# Patient Record
Sex: FEMALE | Race: BLACK OR AFRICAN AMERICAN | HISPANIC OR LATINO | Employment: UNEMPLOYED | URBAN - METROPOLITAN AREA
[De-identification: names, ages, dates, MRNs, and addresses within clinical notes are randomized per-mention and may not be internally consistent; named-entity substitution may affect disease eponyms.]

---

## 2023-06-25 PROBLEM — E11.65 TYPE 2 DIABETES MELLITUS WITH HYPERGLYCEMIA: Status: RESOLVED | Noted: 2023-06-25 | Resolved: 2023-06-25

## 2023-06-25 PROBLEM — E11.65 TYPE 2 DIABETES MELLITUS WITH HYPERGLYCEMIA: Status: ACTIVE | Noted: 2023-06-25

## 2023-06-25 PROBLEM — E66.01 MORBID (SEVERE) OBESITY DUE TO EXCESS CALORIES: Status: ACTIVE | Noted: 2023-06-25

## 2023-06-25 PROBLEM — Z83.3 FAMILY HISTORY OF DIABETES MELLITUS TYPE I: Status: ACTIVE | Noted: 2023-06-25

## 2023-06-25 PROBLEM — E11.9 DIABETES MELLITUS, NEW ONSET: Status: ACTIVE | Noted: 2023-06-25

## 2023-07-25 ENCOUNTER — OFFICE VISIT (OUTPATIENT)
Dept: FAMILY MEDICINE CLINIC | Facility: CLINIC | Age: 23
End: 2023-07-25
Payer: COMMERCIAL

## 2023-07-25 VITALS
RESPIRATION RATE: 16 BRPM | SYSTOLIC BLOOD PRESSURE: 122 MMHG | DIASTOLIC BLOOD PRESSURE: 78 MMHG | HEART RATE: 97 BPM | WEIGHT: 198 LBS | OXYGEN SATURATION: 99 % | HEIGHT: 63 IN | BODY MASS INDEX: 35.08 KG/M2

## 2023-07-25 DIAGNOSIS — E78.1 HYPERTRIGLYCERIDEMIA: ICD-10-CM

## 2023-07-25 DIAGNOSIS — E11.9 DIABETES MELLITUS, NEW ONSET: Primary | ICD-10-CM

## 2023-07-25 DIAGNOSIS — R74.01 ELEVATED ALANINE AMINOTRANSFERASE (ALT) LEVEL: ICD-10-CM

## 2023-07-25 NOTE — PROGRESS NOTES
"Chief Complaint  Diabetes (Follow up )    Subjective          Pili Carter presents to [facility] for  History of Present Illness  She is here to follow-up on Diabetes:    Diabetes - her fasting blood glucose level ranges 120-176.  She has been watching her sugar and carbohydrate intake.  She admits having small portions of rice and bread.  She reports eating pasta.  She has not been eating potato.  She reports eating fruit, vegetables and lean meat.  She reports taking Lantus 10 units nightly.  She takes Metformin 500mg BID and tolerating well.  She denies episode of hypoglycemia.    Hypertriglyceridemia - elevated on prior lab from 6/14/23.    Liver Enzyme (ALT) elevated on prior lab from 6/14/23.        Review of Systems   Respiratory:  Negative for shortness of breath.    Cardiovascular:  Negative for chest pain and palpitations.   Endocrine: Negative for polydipsia, polyphagia and polyuria.       Objective   Vital Signs:   /78 (BP Location: Right arm, Patient Position: Sitting, Cuff Size: Adult)   Pulse 97   Resp 16   Ht 160 cm (62.99\")   Wt 89.8 kg (198 lb)   SpO2 99%   BMI 35.08 kg/mý     Physical Exam  Vitals and nursing note reviewed.   Constitutional:       Appearance: Normal appearance.   HENT:      Head: Normocephalic and atraumatic.   Eyes:      Conjunctiva/sclera: Conjunctivae normal.   Cardiovascular:      Rate and Rhythm: Normal rate and regular rhythm.      Heart sounds: Normal heart sounds. No murmur heard.  Pulmonary:      Effort: Pulmonary effort is normal. No respiratory distress.      Breath sounds: Normal breath sounds.   Skin:     General: Skin is warm and dry.   Neurological:      Mental Status: She is alert.   Psychiatric:         Mood and Affect: Mood normal.      Result Review :   The following data was reviewed by: ALYSE Walter on 07/25/2023:  CMP          6/14/2023    16:06   CMP   Glucose CANCELED    BUN 13    Creatinine " 0.96    Sodium 137    Potassium CANCELED    Chloride 101    Calcium 9.2    Total Protein 6.7    Albumin 4.3    Globulin 2.4    Total Bilirubin 0.4    Alkaline Phosphatase 120    AST (SGOT) 33    ALT (SGPT) 65    BUN/Creatinine Ratio 14      CBC w/diff          6/14/2023    16:06   CBC w/Diff   WBC 7.4    RBC 4.58    Hemoglobin 13.3    Hematocrit 40.0    MCV 87    MCH 29.0    MCHC 33.3    RDW 12.3    Platelets 236    Neutrophil Rel % 55    Lymphocyte Rel % 32    Monocyte Rel % 7    Eosinophil Rel % 6    Basophil Rel % 0      Lipid Panel          6/14/2023    16:06   Lipid Panel   Total Cholesterol 118    Triglycerides 187    HDL Cholesterol 31    VLDL Cholesterol 31    LDL Cholesterol  56      Most Recent A1C          6/14/2023    13:45   HGBA1C Most Recent   Hemoglobin A1C 10.2                Assessment and Plan    Diagnoses and all orders for this visit:    1. Diabetes mellitus, new onset (Primary)  Assessment & Plan:  Diabetes is newly identified.  A1c 10.2 on labs from 6/14/23.  Reminded to bring in blood sugar diary at next visit.  Dietary recommendations for ADA diet.  Regular aerobic exercise.  Discussed ways to avoid symptomatic hypoglycemia.  Discussed foot care.  Reminded to get yearly retinal exam.  Medication changes per orders.  Check blood glucose level before breakfast every morning and log glucose levels on log and bring log to next visit.  Increase Metformin 500mg - 2 tablets at AM and 1 tablet in PM  Lantus 10 units nightly.  Lantus - if blood glucose drops below 90 decrease Lantus by 4 units.  Diabetes will be reassessed in 1 month.    Orders:  -     Insulin Pen Needle 30G X 5 MM misc; One daily as directed.  E11.9  Dispense: 100 each; Refill: 1    2. Hypertriglyceridemia  Assessment & Plan:  Lipid abnormalities are newly identified.  Nutritional counseling was provided.  Discussed importance of decreasing food/drink high in sugar/carbs to help reduce glucose levels.  Increase activity.  Lipids  will be reassessed in 3 months.      3. Elevated alanine aminotransferase (ALT) level  Assessment & Plan:  Elevated ALT on prior lab from 6/14/23.  Discussed importance of lifestyle changes.  Will continue to monitor.          Follow Up   Return in about 1 month (around 8/25/2023) for Next scheduled follow up - Diabetes.  Patient was given instructions and counseling regarding her condition or for health maintenance advice. Please see specific information pulled into the AVS if appropriate.

## 2023-08-12 PROBLEM — R74.01 ELEVATED ALANINE AMINOTRANSFERASE (ALT) LEVEL: Status: ACTIVE | Noted: 2023-08-12

## 2023-08-12 PROBLEM — E78.1 HYPERTRIGLYCERIDEMIA: Status: ACTIVE | Noted: 2023-08-12

## 2023-08-13 NOTE — ASSESSMENT & PLAN NOTE
Lipid abnormalities are newly identified.  Nutritional counseling was provided.  Discussed importance of decreasing food/drink high in sugar/carbs to help reduce glucose levels.  Increase activity.  Lipids will be reassessed in 3 months.

## 2023-08-13 NOTE — ASSESSMENT & PLAN NOTE
Elevated ALT on prior lab from 6/14/23.  Discussed importance of lifestyle changes.  Will continue to monitor.

## 2023-08-13 NOTE — ASSESSMENT & PLAN NOTE
Diabetes is newly identified.  A1c 10.2 on labs from 6/14/23.  Reminded to bring in blood sugar diary at next visit.  Dietary recommendations for ADA diet.  Regular aerobic exercise.  Discussed ways to avoid symptomatic hypoglycemia.  Discussed foot care.  Reminded to get yearly retinal exam.  Medication changes per orders.  Check blood glucose level before breakfast every morning and log glucose levels on log and bring log to next visit.  Increase Metformin 500mg - 2 tablets at AM and 1 tablet in PM  Lantus 10 units nightly.  Lantus - if blood glucose drops below 90 decrease Lantus by 4 units.  Diabetes will be reassessed in 1 month.

## 2025-02-24 ENCOUNTER — OFFICE VISIT (OUTPATIENT)
Dept: FAMILY MEDICINE CLINIC | Facility: CLINIC | Age: 25
End: 2025-02-24
Payer: COMMERCIAL

## 2025-02-24 VITALS
WEIGHT: 186.2 LBS | HEIGHT: 63 IN | OXYGEN SATURATION: 99 % | HEART RATE: 90 BPM | BODY MASS INDEX: 32.99 KG/M2 | SYSTOLIC BLOOD PRESSURE: 124 MMHG | DIASTOLIC BLOOD PRESSURE: 78 MMHG

## 2025-02-24 DIAGNOSIS — E78.1 HYPERTRIGLYCERIDEMIA: Chronic | ICD-10-CM

## 2025-02-24 DIAGNOSIS — Z72.0 TOBACCO USE: Chronic | ICD-10-CM

## 2025-02-24 DIAGNOSIS — Z00.00 HEALTH CARE MAINTENANCE: ICD-10-CM

## 2025-02-24 DIAGNOSIS — E11.65 TYPE 2 DIABETES MELLITUS WITH HYPERGLYCEMIA, WITHOUT LONG-TERM CURRENT USE OF INSULIN: Chronic | ICD-10-CM

## 2025-02-24 DIAGNOSIS — R74.01 ELEVATED ALANINE AMINOTRANSFERASE (ALT) LEVEL: Chronic | ICD-10-CM

## 2025-02-24 DIAGNOSIS — Z83.3 FAMILY HISTORY OF DIABETES MELLITUS IN MOTHER: Chronic | ICD-10-CM

## 2025-02-24 DIAGNOSIS — E66.01 MORBID (SEVERE) OBESITY DUE TO EXCESS CALORIES: Chronic | ICD-10-CM

## 2025-02-24 DIAGNOSIS — Z00.00 ENCOUNTER FOR ANNUAL PHYSICAL EXAM: Primary | ICD-10-CM

## 2025-02-24 LAB
EXPIRATION DATE: ABNORMAL
HBA1C MFR BLD: 9.5 % (ref 4.5–5.7)
Lab: ABNORMAL

## 2025-02-24 RX ORDER — LANCETS 33 GAUGE
1 EACH MISCELLANEOUS DAILY
Qty: 100 EACH | Refills: 1 | Status: SHIPPED | OUTPATIENT
Start: 2025-02-24

## 2025-02-24 RX ORDER — METFORMIN HYDROCHLORIDE 500 MG/1
1000 TABLET, EXTENDED RELEASE ORAL 2 TIMES DAILY WITH MEALS
Qty: 360 TABLET | Refills: 1 | Status: SHIPPED | OUTPATIENT
Start: 2025-02-24

## 2025-02-24 RX ORDER — BLOOD SUGAR DIAGNOSTIC
STRIP MISCELLANEOUS
Qty: 100 EACH | Refills: 1 | Status: SHIPPED | OUTPATIENT
Start: 2025-02-24

## 2025-02-24 RX ORDER — BLOOD-GLUCOSE METER
1 KIT MISCELLANEOUS DAILY
Qty: 1 EACH | Refills: 0 | Status: SHIPPED | OUTPATIENT
Start: 2025-02-24

## 2025-02-24 NOTE — PROGRESS NOTES
Chief Complaint  Annual checkup.     HISTORY    Pili Carter is a 25 y.o. female who presents to the office today as a  an established patient for their annual preventative exam.     No hospitalization(s) within the last year.     Current exercise regimen: None.    Status of chronic medical conditions:    Diabetes - uncontrolled on labs from 6/14/23; A1c 10.2.  She denies currently taking any medication for DM.  She had a lapse in insurance and stopped taking her DM medication when she saw me last around 7/2023.  She said [place] and ran out of that medication in 9/2023.  She has not been cutting back on her sugar and carbs.    She admits only eating 2 meals a day and her levels are still high.  She is not sure of her blood glucose numbers because it has been a while since she measured her glucose level.    Hypertriglyceridemia - triglycerides elevated on prior lab from 6/14/23.    Patient's overall comments about their health or any other specific health concerns they report: She reports her health is fair.    First day of last menstrual period if pre-menopausal: She does not recall because she has Nexplanon and her periods are very irregular.    Patient's contraception status (if applicable):   Nexplanon.    :  Shannan ID# [id number]    Review of Systems   Constitutional: Negative.    HENT: Negative.     Eyes: Negative.    Respiratory: Negative.     Cardiovascular: Negative.    Gastrointestinal: Negative.    Endocrine: Negative for cold intolerance, heat intolerance, polydipsia, polyphagia and polyuria.   Genitourinary:  Positive for menstrual problem (has nexplanon, made appt with GYN r/t removal nexplanon). Negative for amenorrhea, breast discharge, breast lump, breast pain, decreased libido, decreased urine volume, difficulty urinating, dyspareunia, dysuria, flank pain, frequency, genital sores, hematuria, pelvic pain, pelvic pressure,  urgency, urinary incontinence, vaginal bleeding, vaginal discharge and vaginal pain.   Musculoskeletal:  Positive for back pain.        Aching in legs when standing for a while   Skin: Negative.    Allergic/Immunologic: Negative.    Neurological: Negative.    Hematological: Negative.    Psychiatric/Behavioral: Negative.          Health Maintenance Summary            Overdue - DIABETIC FOOT EXAM (Yearly) Never done      No completion history exists for this topic.              Overdue - DIABETIC EYE EXAM (Yearly) Never done      No completion history exists for this topic.              Overdue - HPV VACCINES (1 - 3-dose series) Never done      No completion history exists for this topic.              Overdue - Hepatitis B (1 of 3 - 19+ 3-dose series) Never done      No completion history exists for this topic.              Overdue - Pneumococcal Vaccine 0-49 (1 of 2 - PCV) Never done      No completion history exists for this topic.              Overdue - HEPATITIS C SCREENING (Once) Never done      No completion history exists for this topic.              Overdue - ANNUAL PHYSICAL (Yearly) Never done      No completion history exists for this topic.              Overdue - CHLAMYDIA SCREENING (Yearly) Overdue since 1/25/2024 01/25/2023  Chlamydia trachomatis, SMITH component of Chlamydia trachomatis, Neisseria gonorrhoeae, Trichomonas vaginalis, PCR - Swab, Cervix              Overdue - COVID-19 Vaccine (1 - 2024-25 season) Never done      No completion history exists for this topic.              Postponed - INFLUENZA VACCINE (Yearly - July to March) Postponed until 3/31/2025      No completion history exists for this topic.              HEMOGLOBIN A1C (Every 6 Months) Next due on 8/24/2025 02/24/2025  Hemoglobin A1C component of POC Glycosylated Hemoglobin (Hb A1C)    06/14/2023  Hemoglobin A1C component of POC Glycosylated Hemoglobin (Hb A1C)              PAP SMEAR (Every 3 Years) Next due on 1/25/2026       01/25/2023  IGP, Rfx Aptima HPV ASCU              LIPID PANEL (Yearly) Next due on 2/24/2026 02/24/2025  Lipid Panel    06/14/2023  Lipid Panel              URINE MICROALBUMIN-CREATININE RATIO (uACR) (Yearly) Next due on 2/24/2026 02/24/2025  Microalbumin/Creatinine Ratio component of Microalbumin / Creatinine Urine Ratio - Urine, Clean Catch              BMI FOLLOWUP (Yearly) Next due on 2/24/2026 02/24/2025  SmartData: WORKFLOW - QUALITY MEASUREMENT - DOCUMENTED WEIGHT FOLLOW-UP PLAN    02/24/2025  SmartData: WORKFLOW - QUALITY MEASUREMENT - BMI FOLLOW UP CARE PLAN DOCUMENTED    06/14/2023  SmartData: WORKFLOW - QUALITY MEASUREMENT - BMI FOLLOW UP CARE PLAN DOCUMENTED    06/14/2023  SmartData: WORKFLOW - QUALITY MEASUREMENT - DOCUMENTED WEIGHT FOLLOW-UP PLAN              TDAP/TD VACCINES (2 - Td or Tdap) Next due on 4/25/2033 04/25/2023  Outside Immunization: Tdap, Adsorbed                     No Known Allergies     Outpatient Medications Marked as Taking for the 2/24/25 encounter (Office Visit) with Ananya Garland APRN   Medication Sig Dispense Refill    glucose blood (Cool Blood Glucose Test Strips) test strip USE BRAND INSURANCE COVERS; E11.65 100 each 1    glucose monitor monitoring kit Use 1 each Daily. USE BRAND INSURANCE COVERS; E11.65 1 each 0    Lancets 33G misc Use 1 each Daily. USE BRAND INSURANCE COVERS; e11.65 100 each 1    metFORMIN ER (GLUCOPHAGE-XR) 500 MG 24 hr tablet Take 2 tablets by mouth 2 (Two) Times a Day With Meals. E11.65 360 tablet 1        Past Medical History:   Diagnosis Date    Diabetes mellitus, new onset 06/25/2023     Past Surgical History:   Procedure Laterality Date    APPENDECTOMY       Family History   Problem Relation Age of Onset    Uterine cancer Mother     Ovarian cancer Neg Hx     Breast cancer Neg Hx     Colon cancer Neg Hx     Deep vein thrombosis Neg Hx     Pulmonary embolism Neg Hx     reports that she has been smoking cigarettes. She  "started smoking about 2 years ago. She has a 1 pack-year smoking history. She has never been exposed to tobacco smoke. She has never used smokeless tobacco. She reports current alcohol use. She reports that she does not use drugs.      There is no immunization history on file for this patient.     OBJECTIVE    Vital Signs:   /78 (BP Location: Left arm, Patient Position: Sitting, Cuff Size: Adult)   Pulse 90   Ht 160 cm (62.99\")   Wt 84.5 kg (186 lb 3.2 oz)   SpO2 99%   BMI 32.99 kg/m²     Physical Exam  Vitals and nursing note reviewed.   Constitutional:       General: She is not in acute distress.     Appearance: Normal appearance. She is not ill-appearing.   HENT:      Head: Normocephalic and atraumatic.      Salivary Glands: Right salivary gland is not diffusely enlarged or tender. Left salivary gland is not diffusely enlarged or tender.      Right Ear: Tympanic membrane, ear canal and external ear normal. There is no impacted cerumen. No mastoid tenderness.      Left Ear: Tympanic membrane, ear canal and external ear normal. There is no impacted cerumen. No mastoid tenderness.      Nose: Nose normal. No congestion or rhinorrhea.      Right Sinus: No maxillary sinus tenderness or frontal sinus tenderness.      Left Sinus: No maxillary sinus tenderness or frontal sinus tenderness.      Mouth/Throat:      Lips: No lesions.      Mouth: Mucous membranes are moist.      Dentition: Abnormal dentition. Dental caries present. No dental tenderness.      Pharynx: No oropharyngeal exudate or posterior oropharyngeal erythema.   Eyes:      General: No scleral icterus.        Right eye: No discharge.         Left eye: No discharge.      Extraocular Movements: Extraocular movements intact.      Conjunctiva/sclera: Conjunctivae normal.      Pupils: Pupils are equal, round, and reactive to light.   Neck:      Thyroid: No thyromegaly or thyroid tenderness.      Vascular: No carotid bruit.   Cardiovascular:      Rate and " Rhythm: Normal rate and regular rhythm.      Pulses: Normal pulses.      Heart sounds: Normal heart sounds. No murmur heard.  Pulmonary:      Effort: Pulmonary effort is normal. No respiratory distress.      Breath sounds: Normal breath sounds. No wheezing.   Abdominal:      General: Bowel sounds are normal. There is no distension.      Palpations: Abdomen is soft. There is no mass.      Tenderness: There is no abdominal tenderness. There is no right CVA tenderness, left CVA tenderness, guarding or rebound.      Hernia: There is no hernia in the umbilical area or ventral area.   Genitourinary:     Comments: Deferred.  Musculoskeletal:         General: No swelling or tenderness. Normal range of motion.      Cervical back: Normal range of motion and neck supple. No rigidity or tenderness. Normal range of motion.      Right lower leg: No edema.      Left lower leg: No edema.   Lymphadenopathy:      Cervical: No cervical adenopathy.   Skin:     General: Skin is warm.      Capillary Refill: Capillary refill takes less than 2 seconds.      Findings: No lesion or rash.      Comments: No rash or lesion on visible skin during assessment.   Neurological:      General: No focal deficit present.      Mental Status: She is alert.      Sensory: No sensory deficit.      Motor: No weakness.      Coordination: Coordination normal.      Gait: Gait normal.   Psychiatric:         Mood and Affect: Mood normal.         Behavior: Behavior normal.          The following data was reviewed by: ALYSE Walter on 02/24/2025:    POC Glycosylated Hemoglobin (Hb A1C) (02/24/2025 09:29)   POC Glycosylated Hemoglobin (Hb A1C) (06/14/2023 13:45)  CBC & Differential (06/14/2023 16:06)  Comprehensive Metabolic Panel (06/14/2023 16:06)  Lipid Panel (06/14/2023 16:06)                 ASSESSMENT & PLAN   Diagnoses and all orders for this visit:    1. Encounter for annual physical exam (Primary)  -     CBC & Differential  -     Comprehensive  Metabolic Panel  -     Lipid Panel  -     Microalbumin / Creatinine Urine Ratio - Urine, Clean Catch  -     POC Glycosylated Hemoglobin (Hb A1C)    2. Hypertriglyceridemia  Assessment & Plan:  Discussed importance of decreasing food/drink high in sugar/carbs to help reduce glucose levels.  Increase activity  Referral to Diabetic Nutritional counseling.  Lipids will be re-assessed today.    Orders:  -     Comprehensive Metabolic Panel  -     Lipid Panel  -     Ambulatory Referral to Diabetic Education    3. Type 2 diabetes mellitus with hyperglycemia, without long-term current use of insulin  Assessment & Plan:  Diabetes is improving with treatment. A1c 9.5 today;  today.  Reminded to bring in blood sugar diary at next visit.  Recommended an ADA diet.  Regular aerobic exercise.  Discussed ways to avoid symptomatic hypoglycemia.  Discussed foot care.  Reminded to get yearly retinal exam.  Diabetes educator referral.  Endocrinology clinic referral.  Start Metformin 500mg daily for 1-2 weeks, if tolerated, increase to 1 tablet BID for 2 weeks, if tolerated, increase to 2 tabs in AM & 1 tab in PM for 1-2 weeks, if tolerated then increase to 2 tabs BID and patient understood and agreed.  Encouraged to take with food.  Diabetes will be re-assessed  today .    Orders:  -     Comprehensive Metabolic Panel  -     Microalbumin / Creatinine Urine Ratio - Urine, Clean Catch  -     POC Glycosylated Hemoglobin (Hb A1C)  -     metFORMIN ER (GLUCOPHAGE-XR) 500 MG 24 hr tablet; Take 2 tablets by mouth 2 (Two) Times a Day With Meals. E11.65  Dispense: 360 tablet; Refill: 1  -     Lancets 33G misc; Use 1 each Daily. USE BRAND INSURANCE COVERS; e11.65  Dispense: 100 each; Refill: 1  -     glucose monitor monitoring kit; Use 1 each Daily. USE BRAND INSURANCE COVERS; E11.65  Dispense: 1 each; Refill: 0  -     glucose blood (Cool Blood Glucose Test Strips) test strip; USE BRAND INSURANCE COVERS; E11.65  Dispense: 100 each; Refill:  1  -     Ambulatory Referral to Endocrinology  -     Ambulatory Referral to Diabetic Education    4. Elevated alanine aminotransferase (ALT) level  Assessment & Plan:  ALT elevated on prior lab.  Discussed importance of lifestyle changes.  Will continue to monitor.    Orders:  -     Comprehensive Metabolic Panel    5. Family history of diabetes mellitus in mother  -     Comprehensive Metabolic Panel  -     POC Glycosylated Hemoglobin (Hb A1C)    6. Morbid (severe) obesity due to excess calories  Assessment & Plan:  Patient's (Body mass index is 32.99 kg/m².) indicates that they are obese (BMI >30) with health conditions that include diabetes mellitus and dyslipidemias . Weight is improving with treatment. BMI  is above average; BMI management plan is completed. We discussed low calorie, low carb based diet program, portion control, increasing exercise, joining a fitness center or start home based exercise program, and Referral to DM Educator .   Will continue to monitor.    Orders:  -     Ambulatory Referral to Diabetic Education    7. Tobacco use  Assessment & Plan:  Pt smokes 0.5 packs cigarettes per day.  Encouraged to stop smoking.  Will continue to monitor.      8. Health care maintenance  Comments:  Use sunscreen w/sun expsure.  Working smoke/Carbon Monoxide detectors in home.  Wear seatbelt.  Annual Vision/Dental exam.  Increase activity 30min/day x 5 days      #Annual Preventative Health Examination   -Age and sex appropriate physical exam performed and documented. Updated past medical, family, social and surgical histories as well as allergies and care team list. Addressed care gaps listed in the medical record.  -Encouraged seat belt use for every car ride for patient and all occupants.  Encouraged sunscreen use to reduce risk of skin cancer for any days with sun exposure over 20 minutes. Discussed the importance of smoke and carbon monoxide detectors in the home.   -Encouraged annual dental and vision  exams as part of their overall health.  -Encouraged minimum of 30 minutes or more of exercise at a brisk walk or higher 5 days per week combined with a well-balanced diet.  -Immunizations reviewed and updated in EMR.  Discussed staying up to date with preventative vaccines and patient declined.  -Advised that all women who are planning or capable of pregnancy take a daily supplement containing 0.4 to 0.8 mg (400 to 800 ?g) of folic acid.  The ASCVD Risk score (Pedro DK, et al., 2019) failed to calculate for the following reasons:    The 2019 ASCVD risk score is only valid for ages 40 to 79   -Lipid screening:   Lipid Panel          2/24/2025    10:29   Lipid Panel   Total Cholesterol 147    Triglycerides 144    HDL Cholesterol 36    VLDL Cholesterol 25    LDL Cholesterol  86     Patient is less than age 40 and has had a screening lipid panel in the last 4 years that was abnormal.  -Aspirin for primary or secondary prevention: Not applicable, patient is less than age 50.  -Depression and Anxiety screening: Patient denies symptom of anxiety or depression.  -Diabetes screening:  Patient already has a diagnosis of diabetes mellitus and is being treated.   -Tobacco use screening: Patient reports cigarette use. Tobacco counseling was was not indicated.  -Alcohol use screening: Patient reports drinking 0.25 drinks per week.. Alcohol abuse counseling was was offered and declined by the patient.  -Illicit drug screening: Patient does not use illicit drugs.  -Hypertension screening: Patient screened negative for HTN today.  -HIV screening: Screening not indicated, not in a high-risk group.  -Syphilis screening: Syphilis screening not indicated.  -Hepatitis B virus screening: Screening not indicated, not in a high-risk group.  -Hepatitis C virus screening:  Screening not indicated, not in a high-risk group.  -Colon cancer screening: Patient is less than age 45 and colon cancer screening is not indicated.  -Lung cancer  screening: Patient has smoked but does not meet other eligibility criteria for screening.  -Cervical cancer screening:  Informed patient that the [place]PSTF recommends screening for cervical cancer every 3 years with cervical cytology alone in women aged 21 to 29 years. For women aged 30 to 65 years, the USPSTF recommends screening every 3 years with cervical cytology alone, every 5 years with high-risk human papillomavirus (hrHPV) testing alone, or every 5 years with HPV testing in combination with cytology (cotesting). Lasp pap : approximate date 1/25/23 and was normal.  Recommended to follow-up with GYN for continued cervical screening and she agreed.  -Breast cancer screening:  Breast cancer screening is not applicable at this time.  -BRCA related cancer screening: Informed patient that the USPSTF recommends that primary care clinicians assess women with a personal or family history of breast, ovarian, tubal, or peritoneal cancer or who have an ancestry associated with breast cancer susceptibility 1 and 2 (BRCA1/2) gene mutations with an appropriate brief familial risk assessment tool and referred to genetic counseling if risk assessment is positive. Patient does not have a known personal or family history.   -Osteoporosis screening: Informed patient that the [place]PSTF recommends screening for osteoporosis with bone measurement testing to prevent osteoporotic fractures in women 65 years and older. Screening is not applicable at this time.    Follow up in 1 year for annual physical exam.    Patient/family had no further questions at this time and verbalized understanding of the plan discussed today.     This document has been electronically signed by ALYSE Walter  March 7, 2025 22:48 EST

## 2025-02-25 LAB
ALBUMIN SERPL-MCNC: 4.3 G/DL (ref 4–5)
ALBUMIN/CREAT UR: <4 MG/G CREAT (ref 0–29)
ALP SERPL-CCNC: 132 IU/L (ref 44–121)
ALT SERPL-CCNC: 35 IU/L (ref 0–32)
AST SERPL-CCNC: 17 IU/L (ref 0–40)
BASOPHILS # BLD AUTO: 0.1 X10E3/UL (ref 0–0.2)
BASOPHILS NFR BLD AUTO: 1 %
BILIRUB SERPL-MCNC: 0.3 MG/DL (ref 0–1.2)
BUN SERPL-MCNC: 11 MG/DL (ref 6–20)
BUN/CREAT SERPL: 16 (ref 9–23)
CALCIUM SERPL-MCNC: 9.7 MG/DL (ref 8.7–10.2)
CHLORIDE SERPL-SCNC: 102 MMOL/L (ref 96–106)
CHOLEST SERPL-MCNC: 147 MG/DL (ref 100–199)
CO2 SERPL-SCNC: 22 MMOL/L (ref 20–29)
CREAT SERPL-MCNC: 0.68 MG/DL (ref 0.57–1)
CREAT UR-MCNC: 71.6 MG/DL
EGFRCR SERPLBLD CKD-EPI 2021: 125 ML/MIN/1.73
EOSINOPHIL # BLD AUTO: 0.3 X10E3/UL (ref 0–0.4)
EOSINOPHIL NFR BLD AUTO: 4 %
ERYTHROCYTE [DISTWIDTH] IN BLOOD BY AUTOMATED COUNT: 11.8 % (ref 11.7–15.4)
GLOBULIN SER CALC-MCNC: 2.5 G/DL (ref 1.5–4.5)
GLUCOSE SERPL-MCNC: 262 MG/DL (ref 70–99)
HCT VFR BLD AUTO: 41.4 % (ref 34–46.6)
HDLC SERPL-MCNC: 36 MG/DL
HGB BLD-MCNC: 13.6 G/DL (ref 11.1–15.9)
IMM GRANULOCYTES # BLD AUTO: 0 X10E3/UL (ref 0–0.1)
IMM GRANULOCYTES NFR BLD AUTO: 0 %
LDLC SERPL CALC-MCNC: 86 MG/DL (ref 0–99)
LYMPHOCYTES # BLD AUTO: 2.8 X10E3/UL (ref 0.7–3.1)
LYMPHOCYTES NFR BLD AUTO: 31 %
MCH RBC QN AUTO: 29.9 PG (ref 26.6–33)
MCHC RBC AUTO-ENTMCNC: 32.9 G/DL (ref 31.5–35.7)
MCV RBC AUTO: 91 FL (ref 79–97)
MICROALBUMIN UR-MCNC: <3 UG/ML
MONOCYTES # BLD AUTO: 0.6 X10E3/UL (ref 0.1–0.9)
MONOCYTES NFR BLD AUTO: 7 %
NEUTROPHILS # BLD AUTO: 5.2 X10E3/UL (ref 1.4–7)
NEUTROPHILS NFR BLD AUTO: 57 %
PLATELET # BLD AUTO: 274 X10E3/UL (ref 150–450)
POTASSIUM SERPL-SCNC: 4.2 MMOL/L (ref 3.5–5.2)
PROT SERPL-MCNC: 6.8 G/DL (ref 6–8.5)
RBC # BLD AUTO: 4.55 X10E6/UL (ref 3.77–5.28)
SODIUM SERPL-SCNC: 138 MMOL/L (ref 134–144)
TRIGL SERPL-MCNC: 144 MG/DL (ref 0–149)
VLDLC SERPL CALC-MCNC: 25 MG/DL (ref 5–40)
WBC # BLD AUTO: 8.9 X10E3/UL (ref 3.4–10.8)

## 2025-02-26 ENCOUNTER — TELEPHONE (OUTPATIENT)
Dept: FAMILY MEDICINE CLINIC | Facility: CLINIC | Age: 25
End: 2025-02-26
Payer: COMMERCIAL

## 2025-02-26 NOTE — TELEPHONE ENCOUNTER
Called phone with  and left a voicemail for patient to call back regarding labs   Hub to relay:    Glucose blood level is elevated.  Recommend you start back on your insulin as prescribed.  Also recommend you minimize all food and drink high in sugar and carbs and increase activity daily.    Alkaline phos and liver enzyme (ALT) are both elevated.  Recommend you minimize fatty foods and foods high in sugar to minimize your risk of fatty liver disease.    Cholesterol blood levels are essentially within normal limits.    Microalbumin/creatinine ratio is within normal limits.    Otherwise, all other blood levels are essentially within normal limits.

## 2025-03-04 ENCOUNTER — TELEPHONE (OUTPATIENT)
Dept: ENDOCRINOLOGY | Age: 25
End: 2025-03-04

## 2025-03-04 NOTE — TELEPHONE ENCOUNTER
This patient requires an  for their appointment. Please see the  information below.     Caller: ANDREINA JO  Relationship to patient: SELF  Best call back number: [phone]   Service:IPAD  Is  needs updated in registration: YES  Date of Appointment:3/28/25  Time of Appointment:9:30AM  Additional notes:PT WILL NEED A  FOR HER NEW PATIENT APPOINTMENT WITH DR. POLANCO. THANK YOU

## 2025-03-07 PROBLEM — E11.9 DIABETES MELLITUS, NEW ONSET: Status: RESOLVED | Noted: 2023-06-25 | Resolved: 2025-03-07

## 2025-03-07 PROBLEM — Z72.0 TOBACCO USE: Status: ACTIVE | Noted: 2025-03-07

## 2025-03-07 PROBLEM — Z00.00 HEALTH CARE MAINTENANCE: Status: ACTIVE | Noted: 2025-03-07

## 2025-03-08 NOTE — ASSESSMENT & PLAN NOTE
Patient's (Body mass index is 32.99 kg/m².) indicates that they are obese (BMI >30) with health conditions that include diabetes mellitus and dyslipidemias . Weight is improving with treatment. BMI  is above average; BMI management plan is completed. We discussed low calorie, low carb based diet program, portion control, increasing exercise, joining a fitness center or start home based exercise program, and Referral to DM Educator .   Will continue to monitor.

## 2025-03-08 NOTE — ASSESSMENT & PLAN NOTE
Diabetes is improving with treatment. A1c 9.5 today;  today.  Reminded to bring in blood sugar diary at next visit.  Recommended an ADA diet.  Regular aerobic exercise.  Discussed ways to avoid symptomatic hypoglycemia.  Discussed foot care.  Reminded to get yearly retinal exam.  Diabetes educator referral.  Endocrinology clinic referral.  Start Metformin 500mg daily for 1-2 weeks, if tolerated, increase to 1 tablet BID for 2 weeks, if tolerated, increase to 2 tabs in AM & 1 tab in PM for 1-2 weeks, if tolerated then increase to 2 tabs BID and patient understood and agreed.  Encouraged to take with food.  Diabetes will be re-assessed  today .

## 2025-03-08 NOTE — ASSESSMENT & PLAN NOTE
Discussed importance of decreasing food/drink high in sugar/carbs to help reduce glucose levels.  Increase activity  Referral to Diabetic Nutritional counseling.  Lipids will be re-assessed today.

## 2025-04-01 ENCOUNTER — OFFICE VISIT (OUTPATIENT)
Dept: FAMILY MEDICINE CLINIC | Facility: CLINIC | Age: 25
End: 2025-04-01
Payer: COMMERCIAL

## 2025-04-01 VITALS
HEART RATE: 87 BPM | HEIGHT: 63 IN | BODY MASS INDEX: 32.96 KG/M2 | WEIGHT: 186 LBS | SYSTOLIC BLOOD PRESSURE: 124 MMHG | DIASTOLIC BLOOD PRESSURE: 78 MMHG | OXYGEN SATURATION: 100 %

## 2025-04-01 DIAGNOSIS — E11.65 TYPE 2 DIABETES MELLITUS WITH HYPERGLYCEMIA, WITHOUT LONG-TERM CURRENT USE OF INSULIN: Primary | ICD-10-CM

## 2025-04-01 LAB
ALBUMIN SERPL-MCNC: 4.5 G/DL (ref 3.5–5.2)
ALBUMIN/GLOB SERPL: 1.9 G/DL
ALP SERPL-CCNC: 123 U/L (ref 39–117)
ALT SERPL-CCNC: 26 U/L (ref 1–33)
AST SERPL-CCNC: 21 U/L (ref 1–32)
BILIRUB SERPL-MCNC: 0.4 MG/DL (ref 0–1.2)
BUN SERPL-MCNC: 11 MG/DL (ref 6–20)
BUN/CREAT SERPL: 13.9 (ref 7–25)
CALCIUM SERPL-MCNC: 10.2 MG/DL (ref 8.6–10.5)
CHLORIDE SERPL-SCNC: 104 MMOL/L (ref 98–107)
CO2 SERPL-SCNC: 26.1 MMOL/L (ref 22–29)
CREAT SERPL-MCNC: 0.79 MG/DL (ref 0.57–1)
EGFRCR SERPLBLD CKD-EPI 2021: 107.3 ML/MIN/1.73
GLOBULIN SER CALC-MCNC: 2.4 GM/DL
GLUCOSE SERPL-MCNC: 136 MG/DL (ref 65–99)
HBA1C MFR BLD: 8.4 % (ref 4.8–5.6)
POTASSIUM SERPL-SCNC: 4.1 MMOL/L (ref 3.5–5.2)
PROT SERPL-MCNC: 6.9 G/DL (ref 6–8.5)
SODIUM SERPL-SCNC: 139 MMOL/L (ref 136–145)

## 2025-04-01 PROCEDURE — 99214 OFFICE O/P EST MOD 30 MIN: CPT | Performed by: NURSE PRACTITIONER

## 2025-04-01 NOTE — PROGRESS NOTES
"Chief Complaint  Diabetes Mellitus (2 week follow up from annual exam)    Subjective        HPI     Pili presents to [facility] for follow-up on Diabetes:    Diabetes - prior A1c was 9.5.  Her FBG has been ranging between 126 to 264 \"or something like that\".   She is taking Metformin 1000 mg BID and tolerating medication well.  She denies episodes of hypoglycemia.   She watching her sugar/carb intake.      :  Javier   ID# [id number]        Objective   Vital Signs:   Vitals:    04/01/25 1041   BP: 124/78   Pulse: 87   SpO2: 100%   Weight: 84.4 kg (186 lb)   Height: 160 cm (62.99\")        Physical Exam  Vitals and nursing note reviewed.   Constitutional:       General: She is not in acute distress.     Appearance: Normal appearance. She is not ill-appearing.   HENT:      Head: Normocephalic and atraumatic.   Cardiovascular:      Rate and Rhythm: Normal rate and regular rhythm.      Pulses:           Dorsalis pedis pulses are 2+ on the right side and 2+ on the left side.        Posterior tibial pulses are 2+ on the right side and 2+ on the left side.      Heart sounds: Normal heart sounds. No murmur heard.  Pulmonary:      Effort: Pulmonary effort is normal. No respiratory distress.      Breath sounds: Normal breath sounds. No wheezing.   Musculoskeletal:      Right lower leg: No edema.      Left lower leg: No edema.      Right foot: No deformity or foot drop.      Left foot: No deformity or foot drop.   Feet:      Right foot:      Protective Sensation: 10 sites tested.  10 sites sensed.      Skin integrity: Skin integrity normal. No ulcer, skin breakdown, callus or dry skin.      Toenail Condition: Right toenails are normal.      Left foot:      Protective Sensation: 10 sites tested.  10 sites sensed.      Skin integrity: Skin integrity normal. No ulcer, skin breakdown, callus or dry skin.      Toenail Condition: Left toenails are normal.      Comments: Diabetic Foot Exam " Performed and Monofilament Test Performed    Proprioception bilateral great toes - normal     Neurological:      Mental Status: She is alert.          Result Review :     The following data was reviewed by: ALYSE Walter on 04/01/2025:    Comprehensive Metabolic Panel (02/24/2025 10:29)     POC Glycosylated Hemoglobin (Hb A1C) (02/24/2025 09:29)      Assessment and Plan    Assessment & Plan  Type 2 diabetes mellitus with hyperglycemia, without long-term current use of insulin  Diabetes is  uncontrolled .  A1c 9.5 on prior lab.  Continue current treatment regimen.  Reminded to bring in blood sugar diary at next visit.  Recommended an ADA diet.  Regular aerobic exercise.  Discussed ways to avoid symptomatic hypoglycemia.  Discussed foot care.  Reminded to get yearly retinal exam.  Will re-assess for DM today.  Provided handouts on:  -DM and Nutrition  -Carb Counting for DM  -DM and Exercise  Encouraged to FOLLOW-UP with Endocrinology.  Diabetes will be re-assessed again in 3 months if have not seen Endocrine.    Orders:    Comprehensive Metabolic Panel    Hemoglobin A1c    glucose blood test strip; 1 each by Other route Daily. E11.65        Follow Up   Return in about 3 months (around 7/1/2025) for Next scheduled follow up Chronic Care.  Patient was given instructions and counseling regarding her condition or for health maintenance advice. Please see specific information pulled into the AVS if appropriate.

## 2025-04-01 NOTE — ASSESSMENT & PLAN NOTE
Diabetes is uncontrolled.  A1c 9.5 on prior lab.  Continue current treatment regimen.  Reminded to bring in blood sugar diary at next visit.  Recommended an ADA diet.  Regular aerobic exercise.  Discussed ways to avoid symptomatic hypoglycemia.  Discussed foot care.  Reminded to get yearly retinal exam.  Will re-assess for DM today.  Provided handouts on:  -DM and Nutrition  -Carb Counting for DM  -DM and Exercise  Encouraged to FOLLOW-UP with Endocrinology.  Diabetes will be re-assessed again in 3 months if have not seen Endocrine.    Orders:    Comprehensive Metabolic Panel    Hemoglobin A1c    glucose blood test strip; 1 each by Other route Daily. E11.65

## 2025-04-02 ENCOUNTER — RESULTS FOLLOW-UP (OUTPATIENT)
Dept: FAMILY MEDICINE CLINIC | Facility: CLINIC | Age: 25
End: 2025-04-02
Payer: COMMERCIAL

## 2025-05-07 ENCOUNTER — TELEPHONE (OUTPATIENT)
Dept: OBSTETRICS AND GYNECOLOGY | Facility: CLINIC | Age: 25
End: 2025-05-07
Payer: COMMERCIAL

## 2025-06-12 ENCOUNTER — OFFICE VISIT (OUTPATIENT)
Dept: OBSTETRICS AND GYNECOLOGY | Facility: CLINIC | Age: 25
End: 2025-06-12
Payer: COMMERCIAL

## 2025-06-12 VITALS
HEIGHT: 62 IN | SYSTOLIC BLOOD PRESSURE: 103 MMHG | DIASTOLIC BLOOD PRESSURE: 71 MMHG | WEIGHT: 188 LBS | BODY MASS INDEX: 34.6 KG/M2 | HEART RATE: 93 BPM

## 2025-06-12 DIAGNOSIS — Z01.419 ENCOUNTER FOR GYNECOLOGICAL EXAMINATION WITHOUT ABNORMAL FINDING: Primary | ICD-10-CM

## 2025-06-12 DIAGNOSIS — N83.201 HEMORRHAGIC CYST OF RIGHT OVARY: ICD-10-CM

## 2025-06-12 DIAGNOSIS — Z11.3 SCREEN FOR STD (SEXUALLY TRANSMITTED DISEASE): ICD-10-CM

## 2025-06-12 PROCEDURE — 99395 PREV VISIT EST AGE 18-39: CPT | Performed by: OBSTETRICS & GYNECOLOGY

## 2025-06-12 RX ORDER — IBUPROFEN 600 MG/1
600 TABLET, FILM COATED ORAL EVERY 6 HOURS PRN
Qty: 50 TABLET | Refills: 5 | Status: SHIPPED | OUTPATIENT
Start: 2025-06-12

## 2025-06-12 RX ORDER — BLOOD-GLUCOSE METER
KIT MISCELLANEOUS
COMMUNITY
Start: 2025-02-24

## 2025-06-12 RX ORDER — METRONIDAZOLE 500 MG/1
500 TABLET ORAL
COMMUNITY
Start: 2025-06-10 | End: 2025-06-17

## 2025-06-12 RX ORDER — ONDANSETRON 4 MG/1
4 TABLET, ORALLY DISINTEGRATING ORAL
COMMUNITY
Start: 2025-06-10

## 2025-06-12 NOTE — PROGRESS NOTES
GYN Annual Exam     CC- Here for annual exam.     Pili Carter is a 25 y.o. female who presents for annual well woman exam. Periods are regular every 28-30 days, lasting 6 days. Dysmenorrhea:none. Cyclic symptoms include none. No intermenstrual bleeding, spotting, or discharge.    Pt c/o lower abdominal pain on her Right side. She was seen in ED for the pain with US showing a cyst on her R ovary.     OB History    No obstetric history on file.         Current contraception: none  History of abnormal Pap smear: no  Family history of uterine, colon or ovarian cancer: yes - Mother - uterine cancer   History of abnormal mammogram: no  Family history of breast cancer: no  Pap : 01/25/2023 NIL      Past Medical History:   Diagnosis Date    Diabetes mellitus, new onset 06/25/2023       Past Surgical History:   Procedure Laterality Date    APPENDECTOMY           Current Outpatient Medications:     Blood Glucose Monitoring Suppl (FreeStyle Lite) w/Device kit, USE TO CHECK GLUCOSE ONCE DAILY, Disp: , Rfl:     metroNIDAZOLE (FLAGYL) 500 MG tablet, Take 1 tablet by mouth., Disp: , Rfl:     ondansetron ODT (ZOFRAN-ODT) 4 MG disintegrating tablet, Take 1 tablet by mouth., Disp: , Rfl:     glucose blood test strip, 1 each by Other route Daily. E11.65, Disp: 100 each, Rfl: 1    glucose monitor monitoring kit, Use 1 each Daily. USE BRAND INSURANCE COVERS; E11.65, Disp: 1 each, Rfl: 0    ibuprofen (ADVIL,MOTRIN) 600 MG tablet, Take 1 tablet by mouth Every 6 (Six) Hours As Needed for Mild Pain., Disp: 50 tablet, Rfl: 5    Lancets 33G misc, Use 1 each Daily. USE BRAND INSURANCE COVERS; e11.65, Disp: 100 each, Rfl: 1    metFORMIN ER (GLUCOPHAGE-XR) 500 MG 24 hr tablet, Take 2 tablets by mouth 2 (Two) Times a Day With Meals. E11.65, Disp: 360 tablet, Rfl: 1    No Known Allergies    Social History     Tobacco Use    Smoking status: Every Day     Current packs/day: 0.50     Average packs/day: 0.5 packs/day for 2.3 years (1.1 ttl  "pk-yrs)     Types: Cigarettes     Start date: 2/24/2023     Passive exposure: Never    Smokeless tobacco: Never   Vaping Use    Vaping status: Former    Start date: 1/24/2023    Quit date: 1/24/2024    Substances: Flavoring   Substance Use Topics    Alcohol use: Not Currently     Comment: Drinks 1 glass of wine per month    Drug use: Never       Family History   Problem Relation Age of Onset    Uterine cancer Mother     Ovarian cancer Neg Hx     Breast cancer Neg Hx     Colon cancer Neg Hx     Deep vein thrombosis Neg Hx     Pulmonary embolism Neg Hx        Review of Systems   Constitutional:  Negative for chills and fever.   Gastrointestinal:  Negative for abdominal pain, constipation and diarrhea.   Genitourinary:  Positive for pelvic pain. Negative for menstrual problem, vaginal bleeding and vaginal discharge.   All other systems reviewed and are negative.      /71   Pulse 93   Ht 157.5 cm (62\")   Wt 85.3 kg (188 lb)   LMP 05/27/2025 (Exact Date)   BMI 34.39 kg/m²     Physical Exam  Constitutional:       General: She is not in acute distress.     Appearance: She is well-developed and normal weight.   Genitourinary:      Vulva normal.      Right Labia: No lesions or Bartholin's cyst.     Left Labia: No lesions or Bartholin's cyst.     No inguinal adenopathy present in the right or left side.     No vaginal discharge or bleeding.        Right Adnexa: not tender, not full and no mass present.     Left Adnexa: not tender, not full and no mass present.     No cervical motion tenderness or friability.      Uterus is not enlarged or tender.      No uterine mass detected.     Uterus is anteverted.   Breasts:     Right: No inverted nipple, mass or nipple discharge.      Left: No inverted nipple, mass or nipple discharge.   HENT:      Head: Normocephalic and atraumatic.      Nose: Nose normal.   Eyes:      Conjunctiva/sclera: Conjunctivae normal.      Pupils: Pupils are equal, round, and reactive to light. "   Neck:      Thyroid: No thyromegaly.   Cardiovascular:      Rate and Rhythm: Normal rate and regular rhythm.      Heart sounds: Normal heart sounds. No murmur heard.  Pulmonary:      Effort: Pulmonary effort is normal. No respiratory distress.      Breath sounds: Normal breath sounds.   Abdominal:      General: Abdomen is flat. There is no distension.      Palpations: Abdomen is soft.      Tenderness: There is no abdominal tenderness.   Musculoskeletal:         General: No deformity. Normal range of motion.      Cervical back: Normal range of motion and neck supple.      Right lower leg: No edema.      Left lower leg: No edema.   Lymphadenopathy:      Lower Body: No right inguinal adenopathy. No left inguinal adenopathy.   Neurological:      Mental Status: She is alert and oriented to person, place, and time.   Skin:     General: Skin is warm and dry.      Findings: No erythema.   Psychiatric:         Behavior: Behavior normal.         Thought Content: Thought content normal.         Judgment: Judgment normal.   Vitals reviewed. Exam conducted with a chaperone present.               Assessment     Diagnoses and all orders for this visit:    1. Encounter for gynecological examination without abnormal finding (Primary)    2. Screen for STD (sexually transmitted disease)  -     Chlamydia trachomatis, Neisseria gonorrhoeae, Trichomonas vaginalis, PCR - Swab, Cervix    3. Hemorrhagic cyst of right ovary  -     US Non-ob Transvaginal; Future    Other orders  -     ibuprofen (ADVIL,MOTRIN) 600 MG tablet; Take 1 tablet by mouth Every 6 (Six) Hours As Needed for Mild Pain.  Dispense: 50 tablet; Refill: 5    GYN exam and STD screen   As below   Expectations reviewed     Right hemorrhagic cyst   Will follow up in 6 weeks for resolution   Timing right for copra hemorrhagicum   Requested medication if pain returns      Plan     1) Breast Health - Clinical breast exam yearly, Discussed American cancer society recommendations for  breast cancer screening, and Self breast awareness monthly  CBE updated, normal   2) Pap - up to date  BP good   STD screen done, expectations reviewed.   3) Smoking status- non-smoker   4) Eat a balanced diet including regular green vegetables.   5) Follow up prn and one year.       Carlos Alberto Goodwin MD   6/12/2025  15:36 EDT

## 2025-06-13 LAB
C TRACH RRNA SPEC QL NAA+PROBE: NEGATIVE
N GONORRHOEA RRNA SPEC QL NAA+PROBE: NEGATIVE
T VAGINALIS RRNA SPEC QL NAA+PROBE: NEGATIVE

## 2025-06-16 ENCOUNTER — RESULTS FOLLOW-UP (OUTPATIENT)
Dept: OBSTETRICS AND GYNECOLOGY | Facility: CLINIC | Age: 25
End: 2025-06-16
Payer: COMMERCIAL

## 2025-07-01 ENCOUNTER — OFFICE VISIT (OUTPATIENT)
Dept: FAMILY MEDICINE CLINIC | Facility: CLINIC | Age: 25
End: 2025-07-01
Payer: COMMERCIAL

## 2025-07-01 VITALS
DIASTOLIC BLOOD PRESSURE: 70 MMHG | HEART RATE: 81 BPM | WEIGHT: 187 LBS | BODY MASS INDEX: 34.41 KG/M2 | TEMPERATURE: 98.6 F | SYSTOLIC BLOOD PRESSURE: 126 MMHG | OXYGEN SATURATION: 97 % | HEIGHT: 62 IN

## 2025-07-01 DIAGNOSIS — N76.0 BACTERIAL VAGINOSIS: ICD-10-CM

## 2025-07-01 DIAGNOSIS — D72.829 LEUKOCYTOSIS, UNSPECIFIED TYPE: ICD-10-CM

## 2025-07-01 DIAGNOSIS — B96.89 BACTERIAL VAGINOSIS: ICD-10-CM

## 2025-07-01 DIAGNOSIS — N83.201 CYST OF RIGHT OVARY: Primary | ICD-10-CM

## 2025-07-01 PROCEDURE — 99213 OFFICE O/P EST LOW 20 MIN: CPT | Performed by: NURSE PRACTITIONER

## 2025-07-01 NOTE — PROGRESS NOTES
"Chief Complaint  Diabetes    Subjective        HPI     Pili presents to [facility] for follow-up on recent [facility] ED visit on 6/10/25 for Abdominal Pain:    Pili Carter is a 25 yr/o female, with history of appendicitis s/p appendectomy, small bowel obstruction surgery and diabetes in which she takes metformin. She presented to the ED with c/o right lower back pain radiating to right flank and right lower abdomen.  She took ibuprofen which helped her pain, but pain returned.     CT Abdomen/Pelvis dated 6/10/25 shows \"4.5 cm right adnexal cystic lesion with additional low-density somewhat curvilinear focus is seen posteriorly.\"    Transvaginal US dated 6/10/25 shows:  \"The right ovary contains a complex cystic lesion measuring 4.3 x 3.5 x 3.5 cm with internal reticular echotexture characteristic of a hemorrhagic cyst and small amount of loculated right pelvic fluid.\"    Medications given at discharge:  Zofran 4mg Q8H PRN - she has not needed to take this medication.  Metronidazole 500mg BID x 7 days - she reports taking medication as prescribed.    Copied text on this note has been reviewed and revised by me on 7/1/25.    Today, she denies having right lower quad pain and right lower back pain today.  She followed up with GYN for evaluation.  She indicated GYN recommended follow-up in 6 months and she understands and agreed to follow-up with GYN.    Encouraged patient to call Endocrinology to schedule an appointment to follow-up on DM and she agreed.  Provided patient with phone number for Delvis Gabriel today.    Follow Up:  Carlos Alberto Goodwin MD  [address] [place] [address]  [phone]    :  Nicole ID# [id number]      Review of Systems   Constitutional:  Negative for chills and fever.   Gastrointestinal:  Negative for abdominal pain.   Genitourinary:  Negative for flank pain.   Musculoskeletal:  Negative for back pain.      " "  Objective   Vital Signs:   Vitals:    07/01/25 0934   BP: 126/70   BP Location: Left arm   Patient Position: Sitting   Cuff Size: Adult   Pulse: 81   Temp: 98.6 °F (37 °C)   TempSrc: Oral   SpO2: 97%   Weight: 84.8 kg (187 lb)   Height: 157.5 cm (62.01\")               Physical Exam  Vitals and nursing note reviewed.   Constitutional:       General: She is not in acute distress.     Appearance: Normal appearance. She is not ill-appearing, toxic-appearing or diaphoretic.   HENT:      Head: Normocephalic and atraumatic.   Cardiovascular:      Rate and Rhythm: Normal rate and regular rhythm.      Heart sounds: Normal heart sounds. No murmur heard.  Pulmonary:      Effort: Pulmonary effort is normal. No respiratory distress.      Breath sounds: Normal breath sounds. No wheezing.   Abdominal:      General: Bowel sounds are normal. There is no distension.      Palpations: Abdomen is soft. There is no mass.      Tenderness: There is abdominal tenderness in the right lower quadrant. There is no right CVA tenderness, left CVA tenderness, guarding or rebound.          Comments: C/O \"mild 3/10 tenderness ONLY on palpation\".   Musculoskeletal:      Right lower leg: No edema.      Left lower leg: No edema.   Neurological:      Mental Status: She is alert.          Result Review :     The following data was reviewed by: ALYSE Walter on 07/01/2025:      Urinalysis With Microscopic - (06/10/2025 10:22)  LIPASE (06/10/2025 10:22)  COMPREHENSIVE METABOLIC PANEL (06/10/2025 10:22)  CBC AND DIFFERENTIAL (06/10/2025 10:22)  HCG, SERUM, QUALITATIVE (06/10/2025 10:22)  Chlamydia trachomatis, Neisseria gonorrhoeae, Trichomonas vaginalis, PCR - Swab, Cervix (06/12/2025 15:42)  CT Abdomen Pelvis With Contrast (06/10/2025 13:06)  US Non OB Transvag W/ Duplex Doppler (06/10/2025 14:42)     Assessment and Plan    Assessment & Plan  Cyst of right ovary  Right hemorrhagic cyst.  Patient saw GYN on on 6/12/25.  She is to follow-up again " with Dr. Goodwin in 6 weeks.  Patient to continue to follow-up with Dr. Goodwin, GYN, and she agreed.       Bacterial vaginosis  Asymptomatic.  She completed Metronidazole as prescribed.  Follow-up as needed.         Leukocytosis, unspecified type  WBC blood levels elevated on prior lab.  Will re-assess WBC today.    Orders:    CBC & Differential         Follow Up   Return in about 8 months (around 3/1/2026) for Annual physical.  Patient was given instructions and counseling regarding her condition or for health maintenance advice. Please see specific information pulled into the AVS if appropriate.

## 2025-07-11 ENCOUNTER — OFFICE VISIT (OUTPATIENT)
Dept: ENDOCRINOLOGY | Age: 25
End: 2025-07-11
Payer: COMMERCIAL

## 2025-07-11 VITALS
BODY MASS INDEX: 33.31 KG/M2 | HEART RATE: 87 BPM | DIASTOLIC BLOOD PRESSURE: 80 MMHG | WEIGHT: 181 LBS | OXYGEN SATURATION: 97 % | SYSTOLIC BLOOD PRESSURE: 132 MMHG | HEIGHT: 62 IN

## 2025-07-11 DIAGNOSIS — E11.65 TYPE 2 DIABETES MELLITUS WITH HYPERGLYCEMIA, WITHOUT LONG-TERM CURRENT USE OF INSULIN: Chronic | ICD-10-CM

## 2025-07-11 RX ORDER — ACYCLOVIR 400 MG/1
1 TABLET ORAL
Qty: 3 EACH | Refills: 11 | Status: SHIPPED | OUTPATIENT
Start: 2025-07-11

## 2025-07-11 RX ORDER — METFORMIN HYDROCHLORIDE 500 MG/1
1000 TABLET, EXTENDED RELEASE ORAL 2 TIMES DAILY WITH MEALS
Qty: 360 TABLET | Refills: 1 | Status: SHIPPED | OUTPATIENT
Start: 2025-07-11

## 2025-07-11 RX ORDER — SEMAGLUTIDE 0.68 MG/ML
INJECTION, SOLUTION SUBCUTANEOUS
Qty: 3 ML | Refills: 3 | Status: SHIPPED | OUTPATIENT
Start: 2025-07-11

## 2025-07-11 NOTE — PROGRESS NOTES
Referring provider: ALYSE Walter     Chief complaint/Reason for consult: T2DM    HPI:   - 25 year old female here for management of diabetes mellitus type 2  - Has had diabetes since 2023  - No known complications to date  - Is currently taking metformin XR 1 g bid    The following portions of the patient's history were reviewed and updated as appropriate: allergies, current medications, past family history, past medical history, past social history, past surgical history, and problem list.      Objective     Vitals:    07/11/25 1346   BP: 132/80   Pulse: 87   SpO2: 97%        Physical Exam  Vitals reviewed.   Constitutional:       Appearance: Normal appearance.   HENT:      Head: Normocephalic and atraumatic.   Eyes:      General: No scleral icterus.  Pulmonary:      Effort: Pulmonary effort is normal. No respiratory distress.   Neurological:      Mental Status: She is alert.      Gait: Gait normal.   Psychiatric:         Mood and Affect: Mood normal.         Behavior: Behavior normal.         Thought Content: Thought content normal.         Judgment: Judgment normal.         Assessment & Plan   Type 2 DM, uncontrolled due to hyperglycemia  - Add Ozempic 0.25 mg weekly for 1 month and then 0.5 mg weekly  - Cont. Metformin XR 1 g bid  - Also sent in DexCom G7    2. Obesity (BMI of 33.1)  - Dietary changes and exercise will help glycemic control    - Return to clinic in 3 months

## 2025-07-14 ENCOUNTER — PRIOR AUTHORIZATION (OUTPATIENT)
Dept: ENDOCRINOLOGY | Age: 25
End: 2025-07-14
Payer: COMMERCIAL

## 2025-07-14 NOTE — TELEPHONE ENCOUNTER
Prior Authorization       Prior Authorization for Ozempic  is under review.    CoverMyMeds Key: [id number]        Jinny Gallego MA  7/14/2025  09:43 EDT

## 2025-07-15 ENCOUNTER — PRIOR AUTHORIZATION (OUTPATIENT)
Dept: ENDOCRINOLOGY | Age: 25
End: 2025-07-15
Payer: COMMERCIAL

## 2025-07-15 ENCOUNTER — TELEPHONE (OUTPATIENT)
Dept: FAMILY MEDICINE CLINIC | Facility: CLINIC | Age: 25
End: 2025-07-15
Payer: COMMERCIAL

## 2025-07-15 PROBLEM — N76.0 BACTERIAL VAGINOSIS: Status: ACTIVE | Noted: 2025-07-15

## 2025-07-15 PROBLEM — B96.89 BACTERIAL VAGINOSIS: Status: ACTIVE | Noted: 2025-07-15

## 2025-07-15 PROBLEM — N83.201 CYST OF RIGHT OVARY: Status: ACTIVE | Noted: 2025-07-15

## 2025-07-15 PROBLEM — D72.829 LEUKOCYTOSIS: Status: ACTIVE | Noted: 2025-07-15

## 2025-07-15 NOTE — ASSESSMENT & PLAN NOTE
WBC blood levels elevated on prior lab.  Will re-assess WBC today.    Orders:    CBC & Differential

## 2025-07-15 NOTE — TELEPHONE ENCOUNTER
Prior Authorization       Prior Authorization for Dexcom  is under review.    CoverMyMeds Key: [id number]        Jinny Gallego MA  7/15/2025  10:51 EDT

## 2025-07-15 NOTE — ASSESSMENT & PLAN NOTE
Right hemorrhagic cyst.  Patient saw GYN on on 6/12/25.  She is to follow-up again with Dr. Goodwin in 6 weeks.  Patient to continue to follow-up with Dr. Goodwin, GYN, and she agreed.

## 2025-07-22 ENCOUNTER — TELEPHONE (OUTPATIENT)
Dept: OBSTETRICS AND GYNECOLOGY | Facility: CLINIC | Age: 25
End: 2025-07-22

## 2025-07-22 DIAGNOSIS — N70.11 HYDROSALPINX: Primary | ICD-10-CM

## 2025-07-22 RX ORDER — DOXYCYCLINE 100 MG/1
100 CAPSULE ORAL 2 TIMES DAILY
Qty: 28 CAPSULE | Refills: 0 | Status: SHIPPED | OUTPATIENT
Start: 2025-07-22 | End: 2025-08-05

## 2025-07-22 NOTE — TELEPHONE ENCOUNTER
Sofia,     Seen in June with hemorrhagic cyst on right ovary.   Repeated her ultrasound this week with resolution of that cyst.   However now we see an elongated area concerning for fluid in her tube  I am sending antibiotic to take for this and we should repeat her imaging in 6 -12 weeks  Please arrange. Might need to see me after that ultrasound.       New Medications Ordered This Visit   Medications    doxycycline (MONODOX) 100 MG capsule     Sig: Take 1 capsule by mouth 2 (Two) Times a Day for 14 days.     Dispense:  28 capsule     Refill:  0       Orders Placed This Encounter   Procedures    US Non-ob Transvaginal     Standing Status:   Future     Expected Date:   9/2/2025     Expiration Date:   10/22/2026     Reason for Exam::   See order     Release to patient:   Routine Release [[id number]]          Thanks,   Carlos Alberto Goodwin MD  7/22/2025  16:01 EDT